# Patient Record
(demographics unavailable — no encounter records)

---

## 2025-02-10 NOTE — HEALTH RISK ASSESSMENT
[Excellent] : ~his/her~  mood as  excellent [Yes] : Yes [Never (0 pts)] : Never (0 points) [Never] : Never [With Family] : lives with family [Employed] : employed [Single] : single [No] : In the past 12 months have you used drugs other than those required for medical reasons? No [No falls in past year] : Patient reported no falls in the past year [Little interest or pleasure doing things] : 1) Little interest or pleasure doing things [Feeling down, depressed, or hopeless] : 2) Feeling down, depressed, or hopeless [0] : 2) Feeling down, depressed, or hopeless: Not at all (0) [Time Spent: ___ Minutes] : I spent [unfilled] minutes performing a depression screening for this patient. [NO] : No [HIV test declined] : HIV test declined [Hepatitis C test declined] : Hepatitis C test declined [Feels Safe at Home] : Feels safe at home [Fully functional (bathing, dressing, toileting, transferring, walking, feeding)] : Fully functional (bathing, dressing, toileting, transferring, walking, feeding) [Fully functional (using the telephone, shopping, preparing meals, housekeeping, doing laundry, using] : Fully functional and needs no help or supervision to perform IADLs (using the telephone, shopping, preparing meals, housekeeping, doing laundry, using transportation, managing medications and managing finances) [Reports normal functional visual acuity (ie: able to read med bottle)] : Reports normal functional visual acuity [de-identified] : social [Change in mental status noted] : No change in mental status noted [Language] : denies difficulty with language [Behavior] : denies difficulty with behavior [Learning/Retaining New Information] : denies difficulty learning/retaining new information [Handling Complex Tasks] : denies difficulty handling complex tasks [Reasoning] : denies difficulty with reasoning [Spatial Ability and Orientation] : denies difficulty with spatial ability and orientation [Sexually Active] : not sexually active [High Risk Behavior] : no high risk behavior [Reports changes in hearing] : Reports no changes in hearing [Reports changes in vision] : Reports no changes in vision [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

## 2025-02-10 NOTE — HISTORY OF PRESENT ILLNESS
[FreeTextEntry1] :  patient here for yearly physical exam [de-identified] :  patient here for yearly physical exam. also, c/o erectile dysfunction x 1 year

## 2025-03-02 NOTE — HISTORY OF PRESENT ILLNESS
[FreeTextEntry1] :   ERICK FUENTES Feb 10 1986   Language: English Date of First visit: 02/25/2025 Accompanied by: self Contact info: Referring Provider/PCP: Dr. Phillips  Fax: tw   CC/ Problem List: Erectile dysfunction  =============================================================================== FIRST VISIT / Summary: Very pleasant 39 year old M here for erectile dysfunction since summer of 2024. On risperidone on and off. Restarted Jan 2025. He reports able to have erections 90%. Unsure if able to penetrate. He is a virgin. He verbalizes has to use pelvic muscles in order to achieve full erection 100% Denies any hematuria, dysuria, discharge, lumps or bumps or trauma to genitals, or any changes in urinary pattern  He follows with psychiatrist  Family hx of heart attack and open heart surgery  ------------------------------------------------------------------------------------------- INTERVAL VISITS:   ===============================================================================   PMH: anxiety   FHx: mother leukemia, breast cancer, thyroid cancer alive and well; no  malignancies  SocHx: non smoker, rarely Etoh, single, no children, government customer service   PSH: none    ROS: Review of Systems is as per HPI unless otherwise denoted below   =============================================================================== DATA: LABS (SELECTED):---------------------------------------------------------------------------------------------------     RADS:-------------------------------------------------------------------------------------------------------------------     PATHOLOGY/CYTOLOGY:-------------------------------------------------------------------------------------------     VOIDING STUDIES: ----------------------------------------------------------------------------------------------------     STONE STUDIES: (Analysis/LLSA)----------------------------------------------------------------------------------     PROCEDURES: -----------------------------------------------------------------------------------------------       =============================================================================== PHYSICAL EXAM:    FOCUSED: ---------------------------------------------------------------------------------------------------- 02/25/2025 devon Shaikh NP Penis: No lesions, tenderness, curvature, plaques. circumcised. Meatus: normal caliber Testes: Descended bilaterally. Non tender, no palpable masses Epididymi: No palpable epididymal masses Scrotum: Scrotal wall normal. No spermatic cord mass. No palpable hydroceles   ======================================================================================= DISCUSSION: long discussion re erectile function, normal physiology etc. Rec no medication at this time. GIven family hx of heart issues recommend T measurement. Discussed anxiety may affect ED as much as medications and stay on meds per other physicians ======================================================================================= ASSESSMENT and PLAN   1. ED -labs T recommended -f/u as needed      =======================================================================================  The total time personally spent preparing for this visit (reviewing test results, obtaining external history) and during the visit (ordering tests/medications, spent face to face with the patient / family and counseling them on the above), as well as after the visit (on clinical documentation and coordination with other care providers) was approximately 45 minutes in addition to any procedures.   Thank you for allowing me to assist in the care of your patient. Should you have any questions please do not hesitate to reach out to me.     Luis Vasquez MD                                                    [facility] Physician [facility] [place] [facility]   [place] Office: [address], [address] [place] [address] T: [phone] F: [phone]   [place] Office: [address], 1[address] [place] [address] T: [phone] F: [phone]

## 2025-03-02 NOTE — HISTORY OF PRESENT ILLNESS
[FreeTextEntry1] :   ERICK FUENTES Feb 10 1986   Language: English Date of First visit: 02/25/2025 Accompanied by: self Contact info: Referring Provider/PCP: Dr. Phillips  Fax: tw   CC/ Problem List: Erectile dysfunction  =============================================================================== FIRST VISIT / Summary: Very pleasant 39 year old M here for erectile dysfunction since summer of 2024. On risperidone on and off. Restarted Jan 2025. He reports able to have erections 90%. Unsure if able to penetrate. He is a virgin. He verbalizes has to use pelvic muscles in order to achieve full erection 100% Denies any hematuria, dysuria, discharge, lumps or bumps or trauma to genitals, or any changes in urinary pattern  He follows with psychiatrist  Family hx of heart attack and open heart surgery  ------------------------------------------------------------------------------------------- INTERVAL VISITS:   ===============================================================================   PMH: anxiety   FHx: mother leukemia, breast cancer, thyroid cancer alive and well; no  malignancies  SocHx: non smoker, rarely Etoh, single, no children, government customer service   PSH: none    ROS: Review of Systems is as per HPI unless otherwise denoted below   =============================================================================== DATA: LABS (SELECTED):---------------------------------------------------------------------------------------------------     RADS:-------------------------------------------------------------------------------------------------------------------     PATHOLOGY/CYTOLOGY:-------------------------------------------------------------------------------------------     VOIDING STUDIES: ----------------------------------------------------------------------------------------------------     STONE STUDIES: (Analysis/LLSA)----------------------------------------------------------------------------------     PROCEDURES: -----------------------------------------------------------------------------------------------       =============================================================================== PHYSICAL EXAM:    FOCUSED: ---------------------------------------------------------------------------------------------------- 02/25/2025 devon Shaikh NP Penis: No lesions, tenderness, curvature, plaques. circumcised. Meatus: normal caliber Testes: Descended bilaterally. Non tender, no palpable masses Epididymi: No palpable epididymal masses Scrotum: Scrotal wall normal. No spermatic cord mass. No palpable hydroceles   ======================================================================================= DISCUSSION: long discussion re erectile function, normal physiology etc. Rec no medication at this time. GIven family hx of heart issues recommend T measurement. Discussed anxiety may affect ED as much as medications and stay on meds per other physicians ======================================================================================= ASSESSMENT and PLAN   1. ED -labs T recommended -f/u as needed      =======================================================================================  The total time personally spent preparing for this visit (reviewing test results, obtaining external history) and during the visit (ordering tests/medications, spent face to face with the patient / family and counseling them on the above), as well as after the visit (on clinical documentation and coordination with other care providers) was approximately 45 minutes in addition to any procedures.   Thank you for allowing me to assist in the care of your patient. Should you have any questions please do not hesitate to reach out to me.     Luis Vasquez MD                                                    [facility] Physician [facility] for [facility]   [place] Office: [address], [address] [place] [address] T: [phone] F: [phone]   [place] Office: [address], [address] [place] [address] T: [phone] F: [phone]

## 2025-03-02 NOTE — HISTORY OF PRESENT ILLNESS
[FreeTextEntry1] :   ERICK FUENTES Feb 10 1986   Language: English Date of First visit: 02/25/2025 Accompanied by: self Contact info: Referring Provider/PCP: Dr. Phillips  Fax: tw   CC/ Problem List: Erectile dysfunction  =============================================================================== FIRST VISIT / Summary: Very pleasant 39 year old M here for erectile dysfunction since summer of 2024. On risperidone on and off. Restarted Jan 2025. He reports able to have erections 90%. Unsure if able to penetrate. He is a virgin. He verbalizes has to use pelvic muscles in order to achieve full erection 100% Denies any hematuria, dysuria, discharge, lumps or bumps or trauma to genitals, or any changes in urinary pattern  He follows with psychiatrist  Family hx of heart attack and open heart surgery  ------------------------------------------------------------------------------------------- INTERVAL VISITS:   ===============================================================================   PMH: anxiety   FHx: mother leukemia, breast cancer, thyroid cancer alive and well; no  malignancies  SocHx: non smoker, rarely Etoh, single, no children, government customer service   PSH: none    ROS: Review of Systems is as per HPI unless otherwise denoted below   =============================================================================== DATA: LABS (SELECTED):---------------------------------------------------------------------------------------------------     RADS:-------------------------------------------------------------------------------------------------------------------     PATHOLOGY/CYTOLOGY:-------------------------------------------------------------------------------------------     VOIDING STUDIES: ----------------------------------------------------------------------------------------------------     STONE STUDIES: (Analysis/LLSA)----------------------------------------------------------------------------------     PROCEDURES: -----------------------------------------------------------------------------------------------       =============================================================================== PHYSICAL EXAM:    FOCUSED: ---------------------------------------------------------------------------------------------------- 02/25/2025 devon Shaikh NP Penis: No lesions, tenderness, curvature, plaques. circumcised. Meatus: normal caliber Testes: Descended bilaterally. Non tender, no palpable masses Epididymi: No palpable epididymal masses Scrotum: Scrotal wall normal. No spermatic cord mass. No palpable hydroceles   ======================================================================================= DISCUSSION: long discussion re erectile function, normal physiology etc. Rec no medication at this time. GIven family hx of heart issues recommend T measurement. Discussed anxiety may affect ED as much as medications and stay on meds per other physicians ======================================================================================= ASSESSMENT and PLAN   1. ED -labs T recommended -f/u as needed      =======================================================================================  The total time personally spent preparing for this visit (reviewing test results, obtaining external history) and during the visit (ordering tests/medications, spent face to face with the patient / family and counseling them on the above), as well as after the visit (on clinical documentation and coordination with other care providers) was approximately 45 minutes in addition to any procedures.   Thank you for allowing me to assist in the care of your patient. Should you have any questions please do not hesitate to reach out to me.     Luis Vasquez MD                                                    [facility] Physician [facility] [place] for [facility]   [employer] Office: [address], [address] [place] [address] T: [phone] F: [phone]   [place] Office: [address], [address] [place] [address] T: [phone] F: [phone]